# Patient Record
Sex: MALE
[De-identification: names, ages, dates, MRNs, and addresses within clinical notes are randomized per-mention and may not be internally consistent; named-entity substitution may affect disease eponyms.]

---

## 2020-06-17 ENCOUNTER — HOSPITAL ENCOUNTER (EMERGENCY)
Dept: HOSPITAL 62 - ER | Age: 30
Discharge: LEFT BEFORE DISCHARGE | End: 2020-06-17

## 2020-06-17 DIAGNOSIS — Z53.21: Primary | ICD-10-CM

## 2020-06-17 PROCEDURE — 93005 ELECTROCARDIOGRAM TRACING: CPT

## 2020-06-17 PROCEDURE — 93010 ELECTROCARDIOGRAM REPORT: CPT

## 2020-06-17 NOTE — EKG REPORT
SEVERITY:- NORMAL ECG -

SINUS RHYTHM

ST ELEV, PROBABLE NORMAL EARLY REPOL PATTERN

:

Confirmed by: Norberto Whitaker MD 17-Jun-2020 18:31:29